# Patient Record
(demographics unavailable — no encounter records)

---

## 2025-03-11 NOTE — HISTORY OF PRESENT ILLNESS
[TextBox_4] : EDGAR returns today for a follow up visit for hydronephrosis.  [facility] (Aug 2019) demonstrated no reflux and no posterior urethral valve. No interval urologic issues.  Initial in-office kidney/bladder ultrasound (July 2020) demonstrated worsening hydronephrosis which increased from left grade 1 hydronephrosis to left grade 3 hydronephrosis and left grade 2 after voiding. Mother did not prefer Mag 3 Renal Scan. Most recent in-office sonogram (3/2024) demonstrated left grade 2 hydronephrosis. He returns today for repeat ultrasounds. No interval urologic issues.

## 2025-03-11 NOTE — CONSULT LETTER
[FreeTextEntry1] : Dear Dr. JULIA MCCLENDON ,  I had the pleasure of seeing  EDGAR LANGE for follow up today.  Below is my note regarding the office visit today.  Thank you so very much for allowing me to participate in EDGAR's  care.  Please don't hesitate to call me should any questions or issues arise .  Sincerely,   Faustino Pereira MD, FACS, FSPU Chief, Pediatric Urology Professor of Urology and Pediatrics [facility] [employer] [employer]  President, [employer] - [place] Section Past-President, Societies for Pediatric Urology

## 2025-03-11 NOTE — ASSESSMENT
[FreeTextEntry1] : EDGAR has resolution of the hydronephrosis on today's ultrasound, and he has no reflux. At this time, no further imaging studies are needed unless a UTI or other pertinent clinical situation were to develop. I recommended yearly UA and blood pressure check through adolescence in the primary care setting. This information was communicated to the family and all questions were answered. he will return here as needed.

## 2025-03-11 NOTE — CONSULT LETTER
[FreeTextEntry1] : Dear Dr. JULIA MCCLENDON ,  I had the pleasure of seeing  EDGAR LANGE for follow up today.  Below is my note regarding the office visit today.  Thank you so very much for allowing me to participate in EDGAR's  care.  Please don't hesitate to call me should any questions or issues arise .  Sincerely,   Faustino Pereira MD, FACS, FSPU Chief, Pediatric Urology Professor of Urology and Pediatrics [facility] [employer]  President, [employer] Association - [place] Section Past-President, Societies for Pediatric Urology

## 2025-03-11 NOTE — CONSULT LETTER
[FreeTextEntry1] : Dear Dr. JULIA MCCLENDON ,  I had the pleasure of seeing  EDGAR LANGE for follow up today.  Below is my note regarding the office visit today.  Thank you so very much for allowing me to participate in EDGAR's  care.  Please don't hesitate to call me should any questions or issues arise .  Sincerely,   Faustino Pereira MD, FACS, FSPU Chief, Pediatric Urology Professor of Urology and Pediatrics [facility] [employer]  President, [employer] Association - [place] Section Past-President, Societies for Pediatric Urology

## 2025-06-20 NOTE — HISTORY OF PRESENT ILLNESS
[de-identified] : 6 year old male referred after mother tested positive for H pylori. No c/o abd pain. No N/V.  No rash, jt pain or fever.  BMs daily. No diarrhea or constipation.  Accompanied to visit by his mother, father and sister Willow Family Hx: mother with H pylori

## 2025-06-20 NOTE — ASSESSMENT
[FreeTextEntry1] : 6 year old male with family history of H pylori. Mother recently underwent therapy and referred by PMD for further assessment along with his sister Willow  Plan for stool for H pylori diet changes hi fiber diet with inc fluids parents expressed understanding and were in agreement with plan

## 2025-06-20 NOTE — PHYSICAL EXAM
[Well Developed] : well developed [NAD] : in no acute distress [PERRL] : pupils were equal, round, reactive to light  [Moist & Pink Mucous Membranes] : moist and pink mucous membranes [CTAB] : lungs clear to auscultation bilaterally [Regular Rate and Rhythm] : regular rate and rhythm [Normal S1, S2] : normal S1 and S2 [Soft] : soft  [Normal Bowel Sounds] : normal bowel sounds [No HSM] : no hepatosplenomegaly appreciated [Normal Tone] : normal tone [Well-Perfused] : well-perfused [Interactive] : interactive [icteric] : anicteric [Respiratory Distress] : no respiratory distress  [Distended] : non distended [Tender] : non tender [Edema] : no edema [Cyanosis] : no cyanosis [Rash] : no rash [Jaundice] : no jaundice [FreeTextEntry1] : with eyeglasses

## 2025-06-20 NOTE — CONSULT LETTER
[Dear  ___] : Dear  [unfilled], [Consult Letter:] : I had the pleasure of evaluating your patient, [unfilled]. [Please see my note below.] : Please see my note below. [Consult Closing:] : Thank you very much for allowing me to participate in the care of this patient.  If you have any questions, please do not hesitate to contact me. [Sincerely,] : Sincerely, [FreeTextEntry3] : Mar South MD Division of Pediatric Gastroenterology [facility]